# Patient Record
Sex: FEMALE | Race: WHITE | NOT HISPANIC OR LATINO | URBAN - NONMETROPOLITAN AREA
[De-identification: names, ages, dates, MRNs, and addresses within clinical notes are randomized per-mention and may not be internally consistent; named-entity substitution may affect disease eponyms.]

---

## 2021-05-25 ENCOUNTER — OUT OF OFFICE VISIT (OUTPATIENT)
Dept: URBAN - NONMETROPOLITAN AREA MEDICAL CENTER 3 | Facility: MEDICAL CENTER | Age: 46
End: 2021-05-25
Payer: MEDICARE

## 2021-05-25 DIAGNOSIS — R10.13 ABDOMINAL DISCOMFORT, EPIGASTRIC: ICD-10-CM

## 2021-05-25 DIAGNOSIS — R11.2 ACUTE NAUSEA WITH NONBILIOUS VOMITING: ICD-10-CM

## 2021-05-25 DIAGNOSIS — B02.9 HERPES ZOSTER: ICD-10-CM

## 2021-05-25 PROCEDURE — 99222 1ST HOSP IP/OBS MODERATE 55: CPT | Performed by: INTERNAL MEDICINE

## 2021-05-25 PROCEDURE — G8427 DOCREV CUR MEDS BY ELIG CLIN: HCPCS | Performed by: INTERNAL MEDICINE

## 2021-05-26 ENCOUNTER — OUT OF OFFICE VISIT (OUTPATIENT)
Dept: URBAN - NONMETROPOLITAN AREA MEDICAL CENTER 3 | Facility: MEDICAL CENTER | Age: 46
End: 2021-05-26
Payer: MEDICARE

## 2021-05-26 DIAGNOSIS — R10.13 ABDOMINAL DISCOMFORT, EPIGASTRIC: ICD-10-CM

## 2021-05-26 DIAGNOSIS — K29.60 ADENOPAPILLOMATOSIS GASTRICA: ICD-10-CM

## 2021-05-26 PROCEDURE — 43239 EGD BIOPSY SINGLE/MULTIPLE: CPT | Performed by: INTERNAL MEDICINE

## 2021-05-27 ENCOUNTER — OUT OF OFFICE VISIT (OUTPATIENT)
Dept: URBAN - NONMETROPOLITAN AREA MEDICAL CENTER 3 | Facility: MEDICAL CENTER | Age: 46
End: 2021-05-27
Payer: MEDICARE

## 2021-05-27 DIAGNOSIS — B02.9 HERPES ZOSTER: ICD-10-CM

## 2021-05-27 DIAGNOSIS — R10.13 ABDOMINAL DISCOMFORT, EPIGASTRIC: ICD-10-CM

## 2021-05-27 DIAGNOSIS — R11.2 ACUTE NAUSEA WITH NONBILIOUS VOMITING: ICD-10-CM

## 2021-05-27 PROCEDURE — 99232 SBSQ HOSP IP/OBS MODERATE 35: CPT | Performed by: REGISTERED NURSE

## 2021-06-01 ENCOUNTER — WEB ENCOUNTER (OUTPATIENT)
Dept: URBAN - NONMETROPOLITAN AREA CLINIC 2 | Facility: CLINIC | Age: 46
End: 2021-06-01

## 2021-06-15 ENCOUNTER — LAB OUTSIDE AN ENCOUNTER (OUTPATIENT)
Dept: URBAN - NONMETROPOLITAN AREA CLINIC 2 | Facility: CLINIC | Age: 46
End: 2021-06-15

## 2021-06-15 ENCOUNTER — OFFICE VISIT (OUTPATIENT)
Dept: URBAN - NONMETROPOLITAN AREA CLINIC 2 | Facility: CLINIC | Age: 46
End: 2021-06-15
Payer: MEDICARE

## 2021-06-15 ENCOUNTER — WEB ENCOUNTER (OUTPATIENT)
Dept: URBAN - NONMETROPOLITAN AREA CLINIC 2 | Facility: CLINIC | Age: 46
End: 2021-06-15

## 2021-06-15 VITALS
BODY MASS INDEX: 39.99 KG/M2 | HEART RATE: 69 BPM | SYSTOLIC BLOOD PRESSURE: 110 MMHG | HEIGHT: 65 IN | DIASTOLIC BLOOD PRESSURE: 74 MMHG | WEIGHT: 240 LBS

## 2021-06-15 DIAGNOSIS — R10.13 EPIGASTRIC DISCOMFORT: ICD-10-CM

## 2021-06-15 DIAGNOSIS — K85.90 ACUTE PANCREATITIS, UNSPECIFIED COMPLICATION STATUS, UNSPECIFIED PANCREATITIS TYPE: ICD-10-CM

## 2021-06-15 DIAGNOSIS — Z72.0 TOBACCO USE: ICD-10-CM

## 2021-06-15 DIAGNOSIS — E66.9 OBESITY (BMI 35.0-39.9 WITHOUT COMORBIDITY): ICD-10-CM

## 2021-06-15 DIAGNOSIS — Z95.0 HISTORY OF CARDIAC PACEMAKER: ICD-10-CM

## 2021-06-15 DIAGNOSIS — F12.90 MARIJUANA USE: ICD-10-CM

## 2021-06-15 DIAGNOSIS — K59.00 CONSTIPATION IN FEMALE: ICD-10-CM

## 2021-06-15 DIAGNOSIS — Z12.11 COLON CANCER SCREENING: ICD-10-CM

## 2021-06-15 PROCEDURE — 99214 OFFICE O/P EST MOD 30 MIN: CPT | Performed by: INTERNAL MEDICINE

## 2021-06-15 RX ORDER — SUCRALFATE 1 G
1 TABLET ON AN EMPTY STOMACH TABLET ORAL TWICE A DAY
Status: ACTIVE | COMMUNITY

## 2021-06-15 RX ORDER — GABAPENTIN 800 MG/1
1 CAPSULE TABLET, FILM COATED ORAL TID
Status: ACTIVE | COMMUNITY

## 2021-06-15 RX ORDER — DIVALPROEX SODIUM 500 MG/1
1 TABLET TABLET, EXTENDED RELEASE ORAL ONCE A DAY
Status: ACTIVE | COMMUNITY

## 2021-06-15 RX ORDER — AMLODIPINE BESYLATE 10 MG/1
1 TABLET TABLET ORAL ONCE A DAY
Status: ACTIVE | COMMUNITY

## 2021-06-15 RX ORDER — LISINOPRIL 20 MG/1
1 TABLET TABLET ORAL ONCE A DAY
Status: ACTIVE | COMMUNITY

## 2021-06-15 RX ORDER — ALPRAZOLAM 0.5 MG/1
1 TABLET TABLET ORAL TWICE A DAY
Status: ACTIVE | COMMUNITY

## 2021-06-15 NOTE — HPI-TODAY'S VISIT:
6/15/2021: Gastroenterology Clinic Appointment  Ms. 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  During Ms. Marr's hospitalization, she underwent a CT Abdomen/Pelvis which did not show evidence of acute pathology but did reveal evidence of constipation. Laboratory evaluation was reportedly unremarkable. Ms. Marr notes that she was told she had an elevated lipase during her hospitalization.   She underwent an EGD on 5/26/2021 which showed noraml esophagus, mild non-erosive antral gastritis s/p biopsies, normal stomach on retroflexed views, mild duodenitis of the bulb. Pathology of the gastric mucosa revealed "minimal chronic gastritis. Negative for intestinal metaplasia, dysplasia, or Helicobacter pylori organisms. She reportedly had dermatologic findings consistent with Shingles during her hospitalization near the right upper quadrant of the abdomen.   Ms. Marr has undergone a colonoscopy in the past which was reportedly unremarkable.   Currently, Ms. Marr experiences abdominal bloating/right upper quadrant discomfort when consuming a large volume of food. This occurs on a daily basis with the discomfort radiating to her back. She does not experience the symptoms if she eats a small volume of food. She has also noted that carafate has led to improvement of symptoms.   She does acknowledge using marijuana daily. She acknoweldges using non-steroidal anti-inflamamtroy medications.   5/26/2021: EGD 1. Normal esophagus with GE junction at 40 cm from the incisors. 2. Mild non-erosive antral gastritis status post multiple biopsies to rule out Helicobacter pylori infection. 3. Normal stomach on retroflexed views. 4. Patent and symmetric pylorus. 5. Mild duodenitis of the bulb.  5/26/2021: Pathology from EGD Gastric Biopsy: Minimal chronic gastritis. Negative for intestinal metaplasia, dysplasia or Helicobacter pylori organisms.

## 2021-06-15 NOTE — PHYSICAL EXAM SKIN:
surgical scars on abdomen appear well-healed, no rashes , no suspicious lesions , no areas of discoloration , no jaundice present , good turgor , no masses , no tenderness on palpation

## 2021-06-15 NOTE — PHYSICAL EXAM GASTROINTESTINAL
Abdomen , Central adiposity appreciated, soft, nontender, nondistended , no guarding or rigidity , no masses palpable , normal bowel sounds , Liver and Spleen , no hepatomegaly present , no hepatosplenomegaly , liver nontender , spleen not palpable

## 2021-06-15 NOTE — PHYSICAL EXAM HENT:
Head,  normocephalic,  atraumatic,  Face,  Face within normal limits,  Ears,  External ears within normal limits,  Nose/Nasopharynx,  External nose  normal appearance,  nares patent,  no nasal discharge,  Mouth and Throat,  Oral cavity appearance normal ,  Lips,  Appearance normal

## 2021-06-17 LAB
A/G RATIO: 1.8
ALBUMIN: 4.4
ALKALINE PHOSPHATASE: 104
ALT (SGPT): 18
AST (SGOT): 20
BASO (ABSOLUTE): 0.1
BASOS: 1
BILIRUBIN, TOTAL: <0.2
BUN/CREATININE RATIO: 16
BUN: 14
C-REACTIVE PROTEIN, QUANT: 3
CALCIUM: 9.5
CARBON DIOXIDE, TOTAL: 26
CHLORIDE: 102
CREATININE: 0.86
EGFR IF AFRICN AM: 93
EGFR IF NONAFRICN AM: 81
ENDOMYSIAL ANTIBODY IGA: NEGATIVE
EOS (ABSOLUTE): 0.5
EOS: 8
GLOBULIN, TOTAL: 2.4
GLUCOSE: 85
HEMATOCRIT: 44
HEMATOLOGY COMMENTS:: (no result)
HEMOGLOBIN: 15
IMMATURE CELLS: (no result)
IMMATURE GRANS (ABS): 0
IMMATURE GRANULOCYTES: 0
IMMUNOGLOBULIN A, QN, SERUM: 263
LYMPHS (ABSOLUTE): 3.1
LYMPHS: 49
MCH: 31.6
MCHC: 34.1
MCV: 93
MONOCYTES(ABSOLUTE): 0.5
MONOCYTES: 8
NEUTROPHILS (ABSOLUTE): 2.2
NEUTROPHILS: 34
NRBC: (no result)
PLATELETS: 208
POTASSIUM: 4.4
PROTEIN, TOTAL: 6.8
RBC: 4.75
RDW: 12.9
SODIUM: 140
T-TRANSGLUTAMINASE (TTG) IGA: <2
TSH: 0.42
WBC: 6.4

## 2021-06-20 ENCOUNTER — TELEPHONE ENCOUNTER (OUTPATIENT)
Dept: URBAN - NONMETROPOLITAN AREA CLINIC 2 | Facility: CLINIC | Age: 46
End: 2021-06-20

## 2021-06-20 PROBLEM — 119416008: Status: ACTIVE | Noted: 2021-06-15

## 2021-06-20 PROBLEM — 305058001: Status: ACTIVE | Noted: 2021-06-20

## 2021-06-20 PROBLEM — 365980008: Status: ACTIVE | Noted: 2021-06-20

## 2021-06-20 PROBLEM — 197456007: Status: ACTIVE | Noted: 2021-06-15

## 2021-06-20 PROBLEM — 733460004: Status: ACTIVE | Noted: 2021-06-20

## 2021-06-21 ENCOUNTER — TELEPHONE ENCOUNTER (OUTPATIENT)
Dept: URBAN - NONMETROPOLITAN AREA CLINIC 2 | Facility: CLINIC | Age: 46
End: 2021-06-21

## 2021-06-21 ENCOUNTER — LAB OUTSIDE AN ENCOUNTER (OUTPATIENT)
Dept: URBAN - NONMETROPOLITAN AREA CLINIC 2 | Facility: CLINIC | Age: 46
End: 2021-06-21

## 2021-06-21 PROBLEM — 161692001: Status: ACTIVE | Noted: 2021-06-21

## 2021-07-06 ENCOUNTER — WEB ENCOUNTER (OUTPATIENT)
Dept: URBAN - NONMETROPOLITAN AREA CLINIC 2 | Facility: CLINIC | Age: 46
End: 2021-07-06

## 2021-07-06 RX ORDER — ALPRAZOLAM 0.5 MG/1
1 TABLET TABLET ORAL TWICE A DAY
Status: ACTIVE | COMMUNITY

## 2021-07-06 RX ORDER — SUCRALFATE 1 G
1 TABLET ON AN EMPTY STOMACH TABLET ORAL TWICE A DAY
Status: ACTIVE | COMMUNITY

## 2021-07-06 RX ORDER — AMLODIPINE BESYLATE 10 MG/1
1 TABLET TABLET ORAL ONCE A DAY
Status: ACTIVE | COMMUNITY

## 2021-07-06 RX ORDER — LISINOPRIL 20 MG/1
1 TABLET TABLET ORAL ONCE A DAY
Status: ACTIVE | COMMUNITY

## 2021-07-06 RX ORDER — DIVALPROEX SODIUM 500 MG/1
1 TABLET TABLET, EXTENDED RELEASE ORAL ONCE A DAY
Status: ACTIVE | COMMUNITY

## 2021-07-06 RX ORDER — OMEPRAZOLE 20 MG/1
1 CAPSULE CAPSULE, DELAYED RELEASE ORAL EVERY 12 HOURS
Qty: 60 CAPSULE | Refills: 0 | OUTPATIENT
Start: 2021-07-08

## 2021-07-06 RX ORDER — GABAPENTIN 800 MG/1
1 CAPSULE TABLET, FILM COATED ORAL TID
Status: ACTIVE | COMMUNITY

## 2021-07-07 ENCOUNTER — WEB ENCOUNTER (OUTPATIENT)
Dept: URBAN - NONMETROPOLITAN AREA CLINIC 2 | Facility: CLINIC | Age: 46
End: 2021-07-07

## 2021-07-08 PROBLEM — 266435005: Status: ACTIVE | Noted: 2021-07-08

## 2021-07-22 ENCOUNTER — DASHBOARD ENCOUNTERS (OUTPATIENT)
Age: 46
End: 2021-07-22

## 2021-07-25 PROBLEM — 162864005: Status: ACTIVE | Noted: 2021-06-20

## 2021-07-25 PROBLEM — 14760008: Status: ACTIVE | Noted: 2021-06-20

## 2021-07-25 PROBLEM — 161692001: Status: ACTIVE | Noted: 2021-06-20

## 2021-07-26 ENCOUNTER — OFFICE VISIT (OUTPATIENT)
Dept: URBAN - NONMETROPOLITAN AREA CLINIC 2 | Facility: CLINIC | Age: 46
End: 2021-07-26

## 2021-07-26 NOTE — HPI-TODAY'S VISIT:
6/15/2021: Gastroenterology Clinic Appointment  Ms. Marr is a 45 year old female with past medical history of hypertension, arthritis, anxiety/depression, Gonzalez-Parkinson White syndrome s/p cardiac ablations with pacemaker, migraine hadaches, marijuana use, nicotine use, peripheral neuropathy, chronic pain on opioid pain medication, who presents after hospitalization at [facility] in May 2021 for acute on chronic abodminal pain.  During Ms. Marr's hospitalization, she underwent a CT Abdomen/Pelvis which did not show evidence of acute pathology but did reveal evidence of constipation. Laboratory evaluation was reportedly unremarkable. Ms. Marr notes that she was told she had an elevated lipase during her hospitalization.   She underwent an EGD on 5/26/2021 which showed noraml esophagus, mild non-erosive antral gastritis s/p biopsies, normal stomach on retroflexed views, mild duodenitis of the bulb. Pathology of the gastric mucosa revealed "minimal chronic gastritis. Negative for intestinal metaplasia, dysplasia, or Helicobacter pylori organisms. She reportedly had dermatologic findings consistent with Shingles during her hospitalization near the right upper quadrant of the abdomen.   Ms. Marr has undergone a colonoscopy in the past which was reportedly unremarkable.   Currently, Ms. Marr experiences abdominal bloating/right upper quadrant discomfort when consuming a large volume of food. This occurs on a daily basis with the discomfort radiating to her back. She does not experience the symptoms if she eats a small volume of food. She has also noted that carafate has led to improvement of symptoms.   She does acknowledge using marijuana daily. She acknoweldges using non-steroidal anti-inflamamtroy medications.   5/26/2021: EGD 1. Normal esophagus with GE junction at 40 cm from the incisors. 2. Mild non-erosive antral gastritis status post multiple biopsies to rule out Helicobacter pylori infection. 3. Normal stomach on retroflexed views. 4. Patent and symmetric pylorus. 5. Mild duodenitis of the bulb.  5/26/2021: Pathology from EGD Gastric Biopsy: Minimal chronic gastritis. Negative for intestinal metaplasia, dysplasia or Helicobacter pylori organisms.   6/15/2021: CBC, chemistry panel, LFTs normal. Celiac serologies negative for Celiac disease. CPR normal. TSH slightly low at 0.425.  7/2/2021: Gastric Emptying Study IMPRESSION: Normal gastric emptying with a T-1/2 of 41 minutes. 36% of activity remaining in the stomach at 59 minutes. 7/7/2021: MRCP IMPRESSION: Normal abdominal MRI.  Plan: - COntinue metamucil. - COntinue PPI. - Avoid marijuana. - FDGard. - Low FODMAP diet.

## 2023-06-23 ENCOUNTER — APPOINTMENT (RX ONLY)
Dept: URBAN - METROPOLITAN AREA OTHER 13 | Facility: OTHER | Age: 48
Setting detail: DERMATOLOGY
End: 2023-06-23

## 2023-06-23 DIAGNOSIS — L82.1 OTHER SEBORRHEIC KERATOSIS: ICD-10-CM

## 2023-06-23 DIAGNOSIS — Z71.89 OTHER SPECIFIED COUNSELING: ICD-10-CM

## 2023-06-23 DIAGNOSIS — L81.4 OTHER MELANIN HYPERPIGMENTATION: ICD-10-CM

## 2023-06-23 DIAGNOSIS — D22 MELANOCYTIC NEVI: ICD-10-CM
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
## 2023-06-23 PROBLEM — D22.61 MELANOCYTIC NEVI OF RIGHT UPPER LIMB, INCLUDING SHOULDER: Status: ACTIVE | Noted: 2023-06-23
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
## 2023-06-23 PROBLEM — D22.62 MELANOCYTIC NEVI OF LEFT UPPER LIMB, INCLUDING SHOULDER: Status: ACTIVE | Noted: 2023-06-23

## 2023-06-23 PROCEDURE — 99203 OFFICE O/P NEW LOW 30 MIN: CPT

## 2023-06-23 PROCEDURE — ? COUNSELING

## 2023-06-23 ASSESSMENT — LOCATION SIMPLE DESCRIPTION DERM
LOCATION SIMPLE: RIGHT POSTERIOR UPPER ARM
LOCATION SIMPLE: LEFT CHEEK
LOCATION SIMPLE: RIGHT LIP
LOCATION SIMPLE: LEFT FOREARM
LOCATION SIMPLE: RIGHT UPPER ARM
LOCATION SIMPLE: RIGHT FOREARM
LOCATION SIMPLE: RIGHT THIGH
LOCATION SIMPLE: LEFT PRETIBIAL REGION
LOCATION SIMPLE: LEFT UPPER ARM
LOCATION SIMPLE: LEFT POSTERIOR UPPER ARM
LOCATION SIMPLE: RIGHT POSTERIOR THIGH
LOCATION SIMPLE: RIGHT CHEEK

## 2023-06-23 ASSESSMENT — LOCATION DETAILED DESCRIPTION DERM
LOCATION DETAILED: RIGHT LOWER CUTANEOUS LIP
LOCATION DETAILED: LEFT CENTRAL MALAR CHEEK
LOCATION DETAILED: LEFT ANTERIOR DISTAL UPPER ARM
LOCATION DETAILED: RIGHT PROXIMAL POSTERIOR THIGH
LOCATION DETAILED: RIGHT ANTERIOR PROXIMAL UPPER ARM
LOCATION DETAILED: LEFT PROXIMAL DORSAL FOREARM
LOCATION DETAILED: LEFT SUPERIOR CENTRAL BUCCAL CHEEK
LOCATION DETAILED: RIGHT VENTRAL PROXIMAL FOREARM
LOCATION DETAILED: RIGHT DISTAL POSTERIOR UPPER ARM
LOCATION DETAILED: RIGHT CENTRAL MALAR CHEEK
LOCATION DETAILED: LEFT PROXIMAL PRETIBIAL REGION
LOCATION DETAILED: LEFT DISTAL POSTERIOR UPPER ARM
LOCATION DETAILED: LEFT DISTAL PRETIBIAL REGION
LOCATION DETAILED: RIGHT PROXIMAL DORSAL FOREARM
LOCATION DETAILED: RIGHT ANTERIOR PROXIMAL THIGH
LOCATION DETAILED: LEFT INFERIOR CENTRAL MALAR CHEEK

## 2023-06-23 ASSESSMENT — LOCATION ZONE DERM
LOCATION ZONE: LEG
LOCATION ZONE: FACE
LOCATION ZONE: LIP
LOCATION ZONE: ARM

## 2023-06-23 NOTE — PROCEDURE: MIPS QUALITY
Quality 226: Preventive Care And Screening: Tobacco Use: Screening And Cessation Intervention: Patient screened for tobacco use and is an ex/non-smoker
Quality 130: Documentation Of Current Medications In The Medical Record: Current Medications Documented
Detail Level: Detailed
Quality 431: Preventive Care And Screening: Unhealthy Alcohol Use - Screening: Patient not identified as an unhealthy alcohol user when screened for unhealthy alcohol use using a systematic screening method
Quality 110: Preventive Care And Screening: Influenza Immunization: Influenza Immunization Administered during Influenza season
Quality 47: Advance Care Plan: Advance Care Planning discussed and documented; advance care plan or surrogate decision maker documented in the medical record.